# Patient Record
Sex: FEMALE | Race: WHITE | NOT HISPANIC OR LATINO | URBAN - METROPOLITAN AREA
[De-identification: names, ages, dates, MRNs, and addresses within clinical notes are randomized per-mention and may not be internally consistent; named-entity substitution may affect disease eponyms.]

---

## 2017-03-21 ENCOUNTER — IMPORTED ENCOUNTER (OUTPATIENT)
Dept: URBAN - METROPOLITAN AREA CLINIC 50 | Facility: CLINIC | Age: 77
End: 2017-03-21

## 2017-03-22 ENCOUNTER — IMPORTED ENCOUNTER (OUTPATIENT)
Dept: URBAN - METROPOLITAN AREA CLINIC 50 | Facility: CLINIC | Age: 77
End: 2017-03-22

## 2017-05-01 ENCOUNTER — IMPORTED ENCOUNTER (OUTPATIENT)
Dept: URBAN - METROPOLITAN AREA CLINIC 50 | Facility: CLINIC | Age: 77
End: 2017-05-01

## 2017-05-30 ENCOUNTER — IMPORTED ENCOUNTER (OUTPATIENT)
Dept: URBAN - METROPOLITAN AREA CLINIC 50 | Facility: CLINIC | Age: 77
End: 2017-05-30

## 2017-05-31 ENCOUNTER — IMPORTED ENCOUNTER (OUTPATIENT)
Dept: URBAN - METROPOLITAN AREA CLINIC 50 | Facility: CLINIC | Age: 77
End: 2017-05-31

## 2017-06-12 ENCOUNTER — IMPORTED ENCOUNTER (OUTPATIENT)
Dept: URBAN - METROPOLITAN AREA CLINIC 50 | Facility: CLINIC | Age: 77
End: 2017-06-12

## 2017-06-27 ENCOUNTER — IMPORTED ENCOUNTER (OUTPATIENT)
Dept: URBAN - METROPOLITAN AREA CLINIC 50 | Facility: CLINIC | Age: 77
End: 2017-06-27

## 2017-07-31 ENCOUNTER — IMPORTED ENCOUNTER (OUTPATIENT)
Dept: URBAN - METROPOLITAN AREA CLINIC 50 | Facility: CLINIC | Age: 77
End: 2017-07-31

## 2018-06-06 ENCOUNTER — IMPORTED ENCOUNTER (OUTPATIENT)
Dept: URBAN - METROPOLITAN AREA CLINIC 50 | Facility: CLINIC | Age: 78
End: 2018-06-06

## 2019-06-26 ENCOUNTER — IMPORTED ENCOUNTER (OUTPATIENT)
Dept: URBAN - METROPOLITAN AREA CLINIC 50 | Facility: CLINIC | Age: 79
End: 2019-06-26

## 2019-12-04 ENCOUNTER — IMPORTED ENCOUNTER (OUTPATIENT)
Dept: URBAN - METROPOLITAN AREA CLINIC 50 | Facility: CLINIC | Age: 79
End: 2019-12-04

## 2020-01-28 NOTE — PATIENT DISCUSSION
no op, no srf, Low risk for malignant transformation. Lesion appears to have been present for many years.

## 2020-01-28 NOTE — PATIENT DISCUSSION
Patient understands condition, prognosis and need for follow up care. details… Regular rate & rhythm, normal S1, S2; no murmurs, gallops or rubs; no S3, S4

## 2020-05-13 NOTE — PATIENT DISCUSSION
OCT is very borderline and VF is very clear. Disc that she must be monitored closely but IOP is doing well. Follow without drops for now.

## 2020-05-14 NOTE — PROCEDURE NOTE: CLINICAL
PROCEDURE NOTE: Punctal Plugs, Quintess Dissolvable ([id number], [id number]) #6 Left Lower Lid. Prior to treatment, the risks/benefits/alternatives were discussed. The patient wished to proceed with procedure. Temporary collagen plugs were inserted. Patient tolerated procedure well. There were no complications. Post procedure instructions given. 0.5mm Quintess. PROCEDURE NOTE: Punctal Plugs, Quintess Dissolvable ([id number], [id number]) #1 Right Lower Lid. Prior to treatment, the risks/benefits/alternatives were discussed. The patient wished to proceed with procedure. Temporary collagen plugs were inserted. Patient tolerated procedure well. There were no complications. Post procedure instructions given. 0.5mm Quintess.

## 2020-06-05 ENCOUNTER — IMPORTED ENCOUNTER (OUTPATIENT)
Dept: URBAN - METROPOLITAN AREA CLINIC 50 | Facility: CLINIC | Age: 80
End: 2020-06-05

## 2020-06-22 ENCOUNTER — IMPORTED ENCOUNTER (OUTPATIENT)
Dept: URBAN - METROPOLITAN AREA CLINIC 50 | Facility: CLINIC | Age: 80
End: 2020-06-22

## 2020-07-24 ENCOUNTER — IMPORTED ENCOUNTER (OUTPATIENT)
Dept: URBAN - METROPOLITAN AREA CLINIC 50 | Facility: CLINIC | Age: 80
End: 2020-07-24

## 2020-08-07 ENCOUNTER — IMPORTED ENCOUNTER (OUTPATIENT)
Dept: URBAN - METROPOLITAN AREA CLINIC 50 | Facility: CLINIC | Age: 80
End: 2020-08-07

## 2020-08-31 ENCOUNTER — IMPORTED ENCOUNTER (OUTPATIENT)
Dept: URBAN - METROPOLITAN AREA CLINIC 50 | Facility: CLINIC | Age: 80
End: 2020-08-31

## 2020-09-17 ENCOUNTER — IMPORTED ENCOUNTER (OUTPATIENT)
Dept: URBAN - METROPOLITAN AREA CLINIC 50 | Facility: CLINIC | Age: 80
End: 2020-09-17

## 2020-09-23 ENCOUNTER — IMPORTED ENCOUNTER (OUTPATIENT)
Dept: URBAN - METROPOLITAN AREA CLINIC 50 | Facility: CLINIC | Age: 80
End: 2020-09-23

## 2020-09-23 NOTE — PATIENT DISCUSSION
"""S/P Levator repair. Healing well. Sutured removed today.  Instructed patient to continue using ""

## 2020-10-19 ENCOUNTER — IMPORTED ENCOUNTER (OUTPATIENT)
Dept: URBAN - METROPOLITAN AREA CLINIC 50 | Facility: CLINIC | Age: 80
End: 2020-10-19

## 2020-10-19 NOTE — PATIENT DISCUSSION
"""S/P Blepharoplasty. Healed well.  Instructed patient to massage lids with vitamin E. Discussed ""

## 2020-11-16 NOTE — PROCEDURE NOTE: CLINICAL
PROCEDURE NOTE: Punctal Plugs, Silicone #1 Left Lower Lid. Anesthesia: Topical. Prep: Antibiotic Drops q 5min x 3. Prior to treatment, the risks/benefits/alternatives were discussed. The patient wished to proceed with procedure. One drop of proparacaine was placed and a drop of lidocaine gel was placed over the puncta. 0.4 mm permanent silicone plugs were inserted in * eyelids. Patient tolerated procedure well. There were no complications. Post procedure instructions given. Aris Arellano PROCEDURE NOTE: Punctal Plugs, Silicone #1 Right Lower Lid. Anesthesia: Topical. Prep: Antibiotic Drops q 5min x 3. Prior to treatment, the risks/benefits/alternatives were discussed. The patient wished to proceed with procedure. One drop of proparacaine was placed and a drop of lidocaine gel was placed over the puncta. 0.4 mm permanent silicone plugs were inserted in * eyelids. Patient tolerated procedure well. There were no complications. Post procedure instructions given. Aris Arellano

## 2020-11-20 ENCOUNTER — IMPORTED ENCOUNTER (OUTPATIENT)
Dept: URBAN - METROPOLITAN AREA CLINIC 50 | Facility: CLINIC | Age: 80
End: 2020-11-20

## 2021-03-22 NOTE — PATIENT DISCUSSION
Per pt lasts about 20 min and then complete back to normal. Pt does NOT have any headache or temple pain or jaw pain or GCA symptoms today. Must call if develops any immediately. Gave sample of Betimol QAM OU to see if helps at all and will recheck in 1 month. Call with any diplopia or other issue. But reviewed with pt that more than likely from long history of migraine. Eval with [facility]er if worsening.

## 2021-04-18 ASSESSMENT — VISUAL ACUITY
OS_SC: 20/25
OS_SC: 20/20-1
OD_BAT: 20/70
OD_SC: 20/25+
OS_SC: 20/20
OD_CC: J1+
OD_SC: 20/20-1
OS_CC: J1+@ 15 IN
OD_SC: 20/25-1
OD_SC: 20/25-2
OS_CC: J1+
OD_CC: J1+@ 15 IN
OD_SC: 20/20
OD_SC: 20/20-2
OD_SC: 20/25+2
OS_BAT: 20/40
OD_SC: 20/20-3
OS_SC: 20/20
OD_CC: J1+
OD_SC: 20/20-
OS_SC: 20/20-
OS_SC: 20/25
OS_SC: 20/20-
OS_SC: 20/20
OS_SC: 20/20-
OD_OTHER: 20/70. 20/100.
OS_SC: 20/20-1
OS_CC: J1+
OD_SC: 20/25
OS_SC: 20/25-1
OS_SC: 20/20
OD_SC: 20/20-
OD_SC: 20/25+
OS_SC: 20/20
OD_SC: 20/20
OS_OTHER: 20/40. 20/80.

## 2021-04-18 ASSESSMENT — TONOMETRY
OD_IOP_MMHG: 14
OS_IOP_MMHG: 16
OS_IOP_MMHG: 14
OS_IOP_MMHG: 16
OD_IOP_MMHG: 18
OS_IOP_MMHG: 18
OS_IOP_MMHG: 16
OS_IOP_MMHG: 16
OD_IOP_MMHG: 15
OD_IOP_MMHG: 16
OD_IOP_MMHG: 15
OD_IOP_MMHG: 16
OS_IOP_MMHG: 14
OD_IOP_MMHG: 15
OS_IOP_MMHG: 16
OD_IOP_MMHG: 15

## 2021-06-28 ENCOUNTER — PREPPED CHART (OUTPATIENT)
Dept: URBAN - METROPOLITAN AREA CLINIC 48 | Facility: CLINIC | Age: 81
End: 2021-06-28

## 2021-06-30 ENCOUNTER — PROBLEM (OUTPATIENT)
Dept: URBAN - METROPOLITAN AREA CLINIC 48 | Facility: CLINIC | Age: 81
End: 2021-06-30

## 2021-06-30 DIAGNOSIS — H02.203: ICD-10-CM

## 2021-06-30 DIAGNOSIS — H02.206: ICD-10-CM

## 2021-06-30 PROCEDURE — 92012 INTRM OPH EXAM EST PATIENT: CPT

## 2021-06-30 RX ORDER — LIFITEGRAST 50 MG/ML: 1 SOLUTION/ DROPS OPHTHALMIC TWICE A DAY

## 2021-06-30 ASSESSMENT — VISUAL ACUITY
OD_SC: 20/25+1
OS_SC: 20/25

## 2021-06-30 ASSESSMENT — TONOMETRY
OS_IOP_MMHG: 14
OD_IOP_MMHG: 14

## 2021-12-17 NOTE — PATIENT DISCUSSION
Cont with timolol qam OU. RECOMMEND I STENT AT TIME OF CAT SX PT UNDERSTANDS IT IS NOT GUARANTEE SHE WILL BE ABLE TO STOP TIMOLOL OR THAT SHE MAY NOT NEED OTHER [address] [place] IN THE FUTURE.

## 2021-12-17 NOTE — PATIENT DISCUSSION
** 01/07/2022 PLAN CE IOL OD CUSTOM MAXX WITH I STENT  THEN  CE IOL OS CUSTOM NEAR -1.75 W/I STENT  AFTER DISCUSSION WITH PT. SHE IS AWARE THAT [facility] WILL HAVE THE READING VISION MARJ**.

## 2022-01-26 NOTE — PATIENT DISCUSSION
DISC AROUND SITE OF ISTENT AND VERY MILD AT THIS TIME. REMAIN UPRIGHT AND LOW ACTIVITIY. CONT WITH ALL DROPS. WITH ANY CHANGE IN VISION MUST CALL AND RTC.

## 2022-01-31 NOTE — PATIENT DISCUSSION
Retinal detachment warnings given. Double Island Pedicle Flap Text: The defect edges were debeveled with a #15 scalpel blade.  Given the location of the defect, shape of the defect and the proximity to free margins a double island pedicle advancement flap was deemed most appropriate.  Using a sterile surgical marker, an appropriate advancement flap was drawn incorporating the defect, outlining the appropriate donor tissue and placing the expected incisions within the relaxed skin tension lines where possible.    The area thus outlined was incised deep to adipose tissue with a #15 scalpel blade.  The skin margins were undermined to an appropriate distance in all directions around the primary defect and laterally outward around the island pedicle utilizing iris scissors.  There was minimal undermining beneath the pedicle flap.

## 2022-01-31 NOTE — PATIENT DISCUSSION
1 WEEK PO: Patient is doing well post-operatively. The importance of post-op drop compliance was emphasized. Drop schedule reviewed with patient. Patient to call if any visual changes or concerns.
Advised to call immediately if any worsening distortion or blurring is noted.
CATARACT SURGERY PLANNED FOR TODAY.
Cont with timolol BID OU.
Cont with timolol qam OU. RECOMMEND I STENT AT TIME OF CAT SX PT UNDERSTANDS IT IS NOT GUARANTEE SHE WILL BE ABLE TO STOP TIMOLOL OR THAT SHE MAY NOT NEED OTHER [address] TX IN THE FUTURE.
Continue current management.
DISC AROUND SITE OF ISTENT AND VERY MILD AT THIS TIME. REMAIN UPRIGHT AND LOW ACTIVITIY. CONT WITH ALL DROPS. WITH ANY CHANGE IN VISION MUST CALL AND RTC.
Discussed condition and exacerbating conditions/situations (e.g., dry/arid environments, overhead fans, air conditioners, side effect of medications).
Discussed lid hygiene, warm compress and eyelid wash.
Discussed monovision. This helps lessen the dependence on glasses, but is a compromise and glasses are often still needed for some activities including driving and binocular vision.
Discussed the importance of blood pressure control in the prevention of ocular complications.
Discussed the importance of blood sugar control in the prevention of ocular complications.
Discussion regarding dry eyes and use of the Advanced Lenses.
Discussion with pt in regards to Dry Eyes and Advanced lenses.
FOLLOW IOP CLOSELY BUT VERY GOOD TODAY.
Glasses Prescription given to patient.
Grade I hypertensive retinopathy.
Grade II hypertensive retinopathy.
It was discussed with the patient the importance of good control of their blood sugar, blood pressure, cholesterol, diet, exercise and weight under the guidance of their diabetic doctor to prevent/halt diabetic retinopathy.
Monitor.
Multifocal IOLs have an added po risk of increased glare or halos and there is no guarantee that the pt will not need glasses po or see 20/20.
No retinal detachment or retinal tear noted.
No worsening today.
OK FOR ADVANCED. SYNERGY. POSSIBILTY OF CUSTOM MONOVISION OR MAXX OU.
PLAN CE IOL OD WITH I STENT THEN CE IOL OS WITH I STENT.
Patient achieved a 15% reduction in IOP from pretreatment levels or a plan is in place to achieve this goal.
Patient understands condition, prognosis and need for follow up care.
Post op gtt instructions reviewed with patient.
Post op instructions reviewed with patients including the use of drops.
Recommend i stent at the time of surgery for tighter control of IOP with less fluctuations.
Recommended artificial tears to use as directed.
Recommended artificial tears to use: 1 drop 4x a day in both eyes.
Recommended observation.
Recommended yearly dilated eye examinations.
Retinal detachment warnings given.
Stable.
Stressed the importance of controlling vascular risk factors.
The IOP is in the target range.
The patient feels that the cataract is significantly impacting daily activities and has elected cataract surgery. The risks, benefits, and alternatives to surgery were discussed. The patient elects to proceed with surgery.
The types of intraocular lenses were reviewed with the patient along with a discussion of their various strengths and weaknesses.
Dementia

## 2022-02-02 NOTE — PATIENT DISCUSSION
Chief Complaint   Patient presents with   • Follow-up     F/U on RA, FM. Currently taking [id number], HCQ.       History of Present Illness    Apryl De La Garza is a 65 year old, female, follow up for fibromyalgia, neuropathy, seronegative Rheumatoid Arthritis and has pain in her hand and feel that hands are swollen along with all over diffuse pain. She has swelling in her metacarpal joints. She is on methodone. She has two week history of cold but no shortness of breath. She cannot stand hot water. She is heat intolerant.     She also has fibromyalgia syndrome flare w bilateral shoulders pain over trapezius and this bad today.     She had an EMG that showed neuropathy in her lower extremities most likely due to diabetes. She had had no benefit from back pain.   The pain is all over and there pain in her all the regions of her body except the chest. She has pain in her 'bones' form her fibromyalgia syndrome.     Past Medical History  Past Medical History:   Diagnosis Date   • Acute non intractable tension-type headache 05/19/2017   • Allergy    • Anemia    • Arthritis    • Bilateral hand pain 06/13/2016   • Bilateral lower extremity edema 01/29/2016   • Bone spur of toe of left foot 05/18/2017   • Bursitis of right foot 11/05/2015   • Cataracts, bilateral 03/2016   • Cervicalgia 02/18/2016   • Chronic pain 06/03/2016   • Colon polyps 11/04/2015   • Congenital Q-T prolongation on ECG 08/15/2016   • Depression with anxiety    • Depressive disorder, not elsewhere classified 1/27/2014   • Diabetes 08/31/2015   • DJD (degenerative joint disease)    • Dysuria 09/22/2015   • Fall 08/29/2015   • Fibromyalgia 03/29/2016   • Gastritis    • Glaucoma 8/19/2013   • Hyperlipidemia    • Hypertension    • Hyponatremia 08/31/2015   • Inflammatory arthritis 10/20/2016   • Intertriginous candidiasis 04/14/2017   • Left thigh pain 02/13/2017   • Literacy problem 08/10/2015    cannot read or write   • Low back pain radiating to both legs  Discussion with pt in regards to Dry Eyes and Advanced lenses. 09/29/2015   • Lynn's neuroma of left foot 03/17/2016   • Neuropathy     feet   • Nevus 03/02/2016   • Numbness of foot 04/22/2016   • Obesity    • AMARI (obstructive sleep apnea)    • Osteoarthritis of both hands 07/12/2016   • Patent foramen ovale 02/08/2016   • RUQ pain 05/05/2017   • Sleeping difficulty    • Urinary frequency 9-8-15, 06/23/2016   • Urinary incontinence     wears a pad   • Urinary tract infection 10/10/2013   • Use of cane as ambulatory aid     in addition to a 4 wheel walker   • Wears eyeglasses         Past Surgical History  Past Surgical History:   Procedure Laterality Date   • Cataract extraction w/  intraocular lens implant Left 03/14/2016   • Cataract extraction w/  intraocular lens implant Right 03/28/2016   • Cholecystectomy     • Colon surgery  2003   • Colonoscopy  11/04/2015   • Colonoscopy diagnostic  11/4/2015    Colon polyps removed repeat in 2 years   • Esophagogastroduodenoscopy  05/15/2017   • Hysterectomy     • Joint replacement  2008    biltareal knee replacements    • Joint replacement Left 7/22/15    revision left TKA   • Knee surgery Left 07/22/2015    Left Poly Exchange and Synovectomy; Dr. Ackerman   • Nail removal Right 04/2015    Great toe removed Re: continual \"ingrown\".   • Shoulder surgery      bilateral   • Small intestine surgery     • Toe surgery  06/05/2017    Distal Phalangectomy Left Great Toe   • Total abdom hysterectomy  1980's    Total Abd Hyst with Ovaries Intact   • Total knee replacement  2003    Knee Replacement   • Total knee replacement  2004    Knee Replacement        Allergies  ALLERGIES:   Allergen Reactions   • Penicillins HIVES   • Tape [Adhesive] RASH   • Aspirin      GI ulcers        Current Medications  Current Outpatient Prescriptions   Medication Sig   • linaGLIPtin (TRADJENTA) 5 MG tablet Take 1 tablet by mouth daily.   • methaDONE (DOLOPHINE) 10 MG tablet Take 1 tablet by mouth 5 times daily as needed for Pain.   • folic acid (FOLATE) 1  MG tablet TAKE 1 TABLET BY MOUTH DAILY   • methotrexate (RHEUMATREX) 2.5 MG tablet Take 6 tablets by mouth once a week.   • dicyclomine (BENTYL) 10 MG capsule Take 1 capsule by mouth 4 times daily (before meals and nightly).   • omeprazole (PRILOSEC) 40 MG capsule Take 1 capsule by mouth 2 times daily (before meals).   • metoCLOPramide (REGLAN) 5 MG tablet Take 1 tablet by mouth 3 times daily (before meals).   • ASPIRIN LOW DOSE 81 MG EC tablet TAKE 1 TABLET BY MOUTH DAILY   • FREESTYLE LITE test strip TEST BLOOD SUGAR FOUR TIMES DAILY   • Lactobacillus (FLORAJEN ACIDOPHILUS PO)    • clotrimazole (LOTRIMIN) 1 % cream Apply topically 2 times daily.   • Incontinence Supply Disposable (ULTIMA INCONTINENCE PAD) Misc Use as needed for constipation   • Lancets (FREESTYLE) Misc TEST FOUR TIMES DAILY   • fluticasone (FLONASE) 50 MCG/ACT nasal spray SHAKE LIQUID AND USE 1 SPRAY IN EACH NOSTRIL DAILY   • lidocaine-prilocaine (EMLA) cream Apply to affected area daily   • hydroxychloroquine (PLAQUENIL) 200 MG tablet Take 1 tablet by mouth 2 times daily.   • capsaicin (ZOSTRIX) 0.025 % cream Apply topically 3 times daily.   • diclofenac (VOLTAREN) 1 % gel Apply 1 gram to the hand joints 4 times daily   • Magnesium Citrate 200 MG Tab Take 200 mg by mouth nightly.   • gabapentin (NEURONTIN) 300 MG capsule TAKE ONE CAPSULE BY MOUTH FOUR TIMES DAILY   • atorvastatin (LIPITOR) 40 MG tablet Take 1 tablet by mouth daily.   • amLODIPine (NORVASC) 5 MG tablet Take 1 tablet by mouth daily.   • lisinopril (PRINIVIL,ZESTRIL) 40 MG tablet Take 1 tablet by mouth daily.   • metformin (GLUCOPHAGE) 1000 MG tablet Take 1 tablet by mouth 2 times daily.   • estradiol (ESTRACE) 0.1 MG/GM vaginal cream Place 1 g vaginally twice a week.   • citalopram (CELEXA) 40 MG tablet TAKE 1 TABLET BY MOUTH EVERY DAY   • Elastic Bandages & Supports (ARTHRITIS GLOVE MEDIUM) Misc 1 Units daily. One pair daily   • Calcium 600 MG tablet Take 1 tablet by mouth daily.    • Cholecalciferol (VITAMIN D3) 1000 units capsule Take 1 capsule by mouth daily.   • trolamine salicylate (ASPERCREME/ALOE) 10 % cream Apply topically as needed for Pain.   • olopatadine (PATANOL) 0.1 % ophthalmic solution Place 1 drop into both eyes daily as needed for Allergies.   • Omega-3 Fatty Acids (FISH OIL) 645 MG Cap Take 2 capsules by mouth daily. Contains 840mg total of EPA and DHA   • Multiple Vitamins-Minerals (MULTIVITAL-M) Tab Take 1 tablet by mouth daily.   • sodium chloride (SALINE MIST) 0.65 % nasal spray Spray 1 spray in each nostril as needed.   • DISPENSE Please dispense 1 power chair   • DISPENSE Please dispense one shower chair   • Elastic Bandages & Supports (JOBST ACTIVE 15-20MMHG MEDIUM) Misc 1 Units daily. Please provide w  Support stocking that zippers. She can not put them on due to hand arthritis and fibromyalgia   • Blood Glucose Monitoring Suppl (FREESTYLE FREEDOM LITE) W/DEVICE Kit Test blood sugar 4 times daily. E11.9   • Elastic Bandages & Supports (LUMBAR BACK BRACE/SUPPORT PAD) Misc 1 Device as needed (for support).   • polyethylene glycol (MIRALAX) powder Every 2-3 days for constipation     No current facility-administered medications for this visit.        Family History  Family History   Problem Relation Age of Onset   • Heart disease Mother    • High blood pressure Mother    • High cholesterol Mother    • Stroke Mother    • High blood pressure Father    • High cholesterol Father    • Stroke Father    • Diabetes Sister    • Diabetes Brother    • Diabetes Brother    • Diabetes Brother    • Diabetes Brother    • Diabetes Brother    • Diabetes Brother    • Diabetes Sister    • Diabetes Sister    • Diabetes Sister    • Diabetes Sister    • Diabetes Sister    • Diabetes Sister       Reviewed   Social History  Social History     Social History   • Marital status:      Spouse name: N/A   • Number of children: N/A   • Years of education: N/A     Occupational History   • Not  on file.     Social History Main Topics   • Smoking status: Former Smoker     Packs/day: 0.50     Years: 30.00     Types: Cigarettes     Start date: 8/24/1968     Quit date: 1/1/2001   • Smokeless tobacco: Never Used   • Alcohol use No   • Drug use: No   • Sexual activity: No     Other Topics Concern   • Seat Belt Yes     Social History Narrative   • No narrative on file        Review of Systems  GEN:  No Fever, wt loss, ++ fatigue,   ENT: No new sudden hearing loss, recurrent severe sinusitis,  otitis media  EYES: No redness, or inflammation like iritis, scleritis etc  MOUTH: No oral sores,   NECK: No lymph node enlargement  CHEST: No cough, hemoptysis, pleuritic pain  HEART: No new murmurs, shortness of breath, edema  ABDOMEN: normal, No blood in stool, upper gi bleeding,  inflammatory bowel disease  HEMAPOIETIC:  No blood clots  NEURO: No stroke like symptoms, numbness and tingling,   MsK: as above  PSYCH: normal  SKIN: no nail pitting, psoriatic like rash, photosensitivity,     Physical Exam    Constitutional:  Vitals:    05/22/18 1110   BP: 112/60   Pulse: 59   Temp: 97.4 °F (36.3 °C)     General Appearance:  Well groomed, NAD  Musculoskeletal:   Gait is slow with walker  Hands are inspected for deformity, swelling and moved for range of motion, strength and stability  Full fisting. Diffuse tenderness, metacarpo-phalangeal joint s1t1 23 bilateral   Both wrists, elbows, and shoulders are inspected and palpated for swelling deformities and weakness and moved to assess range of motion, stability, strength and tone. normal range of motion, diffuse tenderness no synovitis   The feet are inspected for deformity, swelling and moved for range of motion, strength and stability  She has diffuse tenderness  Both subtalar joints, tibiotalar joints, knees, and hip are inspected and palpated for swelling deformities and weakness and moved to assess range of motion, stability, strength and tone. Diffuse tenderness   Achilles:   normal  Planter Fascia: non tender  Tender points: present   Cervical spine diffuse tenderness over the trapezius and spinal muscles  .   Lumbar Spine: diffuse tenderness and in paraspinal muscles     Lungs: Clear to auscultation. Effort is normal  Eyes: pupils equal, normal conjunctiva  Neck: supple  Resp: normal and symmetric effort  CVS: no cyanosis, edema,   Neuro: nonfocal, no cranial nerve abnormalities,   Psych: normal mood, affect, judgement and oriented.    Diagnostic Data:     Lab:   reviewed    Component      Latest Ref Rng 6/6/2016   PROTIME      9.7 - 11.6 sec 10.4   INR       1.0   ESR      0 - 20 mm/hr 26 (H)   Gliadin IGA      <20 UNITS 3   Gliadin IGG      <20 UNITS 2   TTG AB,IGA      <20 UNITS 4   TTG AB, IGG      <20 UNITS 2   Cyclic Citrul Pep AB       <20 Units 4   VITAMIND, 25 HYDROXY      30.0 - 100.0 ng/mL 34.0   GLYCOHEMOGLOBIN A1C      4.5 - 5.6 % 8.8 (H)   C-REACTIVE PROTEIN      <1.0 mg/dL <0.3   RHEUMATOID FACTOR      <15 Units/mL <10     @RESCOM(<NOROUTINE> error)@  Creatinine (mg/dL)   Date Value   05/21/2018 0.69   04/18/2018 0.73   11/20/2017 0.77     Lab Results   Component Value Date    SODIUM 133 (L) 05/21/2018    POTASSIUM 5.3 (H) 05/21/2018    CHLORIDE 96 (L) 05/21/2018    CO2 30 05/21/2018    CREATININE 0.69 05/21/2018    TOTPROTEIN 6.6 05/21/2018    ALBUMIN 3.8 05/21/2018    CALCIUM 8.8 05/21/2018    BILIRUBIN 0.2 05/21/2018    ALKPT 105 05/21/2018    GPT 19 05/21/2018    BCRAT 22 05/21/2018    GLOB 2.8 05/21/2018    GFRNA >90 05/21/2018    GFRA >90 05/21/2018           Radiology Studies:   reviewed    Assessment  65 year old female with fibromyalgia syndrome, diabetic neuropathy and hand osteoarthritis and metacarpo-phalangeal joint tenderness   Reduce methotrexate to 6 tabs once weekly   Cont folic acid 1 mg  Daily   Cont hydroxychloroquine     Recommendations  1. Seronegative rheumatoid arthritis (CMS/HCC)      2. Long term methotrexate user      3. Chronic midline low  back pain without sciatica      4. Diabetic polyneuropathy associated with type 2 diabetes mellitus (CMS/[facility])      5. Fibromyalgia  Taking magnesium     6. Type 2 diabetes mellitus with diabetic neuropathy, with long-term current use of insulin (CMS/[facility])      7. Primary osteoarthritis involving multiple joints        Thank you, Mariana Britton NP, for allowing me to share in the care of this patient.

## 2022-02-16 NOTE — PATIENT DISCUSSION
Post op gtt instructions reviewed with patient. Cheek Interpolation Flap Text: A decision was made to reconstruct the defect utilizing an interpolation axial flap and a staged reconstruction.  A telfa template was made of the defect.  This telfa template was then used to outline the Cheek Interpolation flap.  The donor area for the pedicle flap was then injected with anesthesia.  The flap was excised through the skin and subcutaneous tissue down to the layer of the underlying musculature.  The interpolation flap was carefully excised within this deep plane to maintain its blood supply.  The edges of the donor site were undermined.   The donor site was closed in a primary fashion.  The pedicle was then rotated into position and sutured.  Once the tube was sutured into place, adequate blood supply was confirmed with blanching and refill.  The pedicle was then wrapped with xeroform gauze and dressed appropriately with a telfa and gauze bandage to ensure continued blood supply and protect the attached pedicle.

## 2022-03-16 NOTE — PATIENT DISCUSSION
It was discussed with the patient the importance of good control of their blood sugar, blood pressure, cholesterol, diet, exercise and weight under the guidance of their diabetic doctor to prevent/halt diabetic retinopathy. Patient tolerated procedure well    Report given to holding RN

## 2023-06-14 NOTE — PATIENT DISCUSSION
Multifocal IOLs have an added po risk of increased glare or halos and there is no guarantee that the pt will not need glasses po or see 20/20. 15

## 2025-04-07 NOTE — PATIENT DISCUSSION
Discussed the importance of blood sugar control in the prevention of ocular complications. Pharmacist Admission Medication History    Admission medication history is complete. The information provided in this note is only as accurate as the sources available at the time of the update.    Information Source(s): Patient and CareEverywhere/SureScripts via in-person    Pertinent Information: None    Changes made to PTA medication list:  Added: Prednisone taper  Deleted: Dicyclomine, mesalamine, ondansetron, preparation H  Changed: None    Allergies reviewed with patient and updates made in EHR: yes    Medication History Completed By: Lily Sena RPH 4/7/2025 12:08 PM    PTA Med List   Medication Sig Note Last Dose/Taking    predniSONE (DELTASONE) 5 MG tablet Take by mouth daily. Take 40mg daily x 7 days , 35mg daily x 7 days, 30mg daily x 7 days, 25mg daily x 7 days, 20mg daily x 7 days, 15mg daily x 7 days, 10mg daily x 7 days, 5mg daily x 7 days , then stop    Started 4/3/25 4/7/2025: 40mg dose 4/6/2025 Morning